# Patient Record
Sex: MALE | URBAN - METROPOLITAN AREA
[De-identification: names, ages, dates, MRNs, and addresses within clinical notes are randomized per-mention and may not be internally consistent; named-entity substitution may affect disease eponyms.]

---

## 2017-04-17 ENCOUNTER — APPOINTMENT (OUTPATIENT)
Age: 22
Setting detail: DERMATOLOGY
End: 2017-04-20

## 2017-04-17 DIAGNOSIS — L29.8 OTHER PRURITUS: ICD-10-CM

## 2017-04-17 DIAGNOSIS — L20.89 OTHER ATOPIC DERMATITIS: ICD-10-CM

## 2017-04-17 PROBLEM — L30.9 DERMATITIS, UNSPECIFIED: Status: ACTIVE | Noted: 2017-04-17

## 2017-04-17 PROCEDURE — 99202 OFFICE O/P NEW SF 15 MIN: CPT

## 2017-04-17 PROCEDURE — OTHER TREATMENT REGIMEN: OTHER

## 2017-04-17 PROCEDURE — OTHER PRESCRIPTION: OTHER

## 2017-04-17 PROCEDURE — OTHER OTHER: OTHER

## 2017-04-17 PROCEDURE — OTHER COUNSELING: OTHER

## 2017-04-17 RX ORDER — TRIAMCINOLONE ACETONIDE 1 MG/G
OINTMENT TOPICAL BID
Qty: 1 | Refills: 0 | Status: ERX | COMMUNITY
Start: 2017-04-17

## 2017-04-17 ASSESSMENT — LOCATION DETAILED DESCRIPTION DERM
LOCATION DETAILED: LEFT LATERAL INFERIOR CHEST
LOCATION DETAILED: MID POSTERIOR NECK
LOCATION DETAILED: RIGHT ANTERIOR SHOULDER
LOCATION DETAILED: LEFT ANTECUBITAL SKIN
LOCATION DETAILED: RIGHT ANTERIOR DISTAL UPPER ARM

## 2017-04-17 ASSESSMENT — LOCATION ZONE DERM
LOCATION ZONE: TRUNK
LOCATION ZONE: ARM
LOCATION ZONE: NECK

## 2017-04-17 ASSESSMENT — LOCATION SIMPLE DESCRIPTION DERM
LOCATION SIMPLE: LEFT ELBOW
LOCATION SIMPLE: RIGHT SHOULDER
LOCATION SIMPLE: RIGHT UPPER ARM
LOCATION SIMPLE: CHEST
LOCATION SIMPLE: POSTERIOR NECK

## 2017-04-17 NOTE — PROCEDURE: TREATMENT REGIMEN
Detail Level: Simple
Initiate Treatment: Moisturizing creams to keep the skin hydrated, Zyrtec daily

## 2017-04-17 NOTE — PROCEDURE: OTHER
Other (Free Text): PATIENT MOVED HERE TWO YEARS AGO FROM [place].  HIS ECZEMA HAS PROGRESSIVELY WORSENED SINCE THEN.  HE HAS BEEN USING MOISTURIZERS AND MOMETASONE - BUT IS RUNNING OUT -AND IS NOT WELL CONTROLLED.  \\n\\nADVISED ON DRY SKIN CARE, AND TO USE AQUAPHOR QHS TO GIVE SKIN A STEROID HOLIDAY.
Detail Level: Detailed
Other (Free Text): PATIENT INSTRUCTED TO START ZYRTEC DAILY DURING ALLERGY SEASON.
Note Text (......Xxx Chief Complaint.): This diagnosis correlates with the